# Patient Record
Sex: MALE | Race: BLACK OR AFRICAN AMERICAN | HISPANIC OR LATINO | Employment: FULL TIME | URBAN - METROPOLITAN AREA
[De-identification: names, ages, dates, MRNs, and addresses within clinical notes are randomized per-mention and may not be internally consistent; named-entity substitution may affect disease eponyms.]

---

## 2021-08-20 ENCOUNTER — HOSPITAL ENCOUNTER (EMERGENCY)
Facility: HOSPITAL | Age: 46
Discharge: HOME | End: 2021-08-20
Attending: EMERGENCY MEDICINE
Payer: OTHER GOVERNMENT

## 2021-08-20 VITALS
WEIGHT: 185 LBS | TEMPERATURE: 99 F | OXYGEN SATURATION: 98 % | SYSTOLIC BLOOD PRESSURE: 155 MMHG | HEIGHT: 71 IN | RESPIRATION RATE: 16 BRPM | DIASTOLIC BLOOD PRESSURE: 83 MMHG | BODY MASS INDEX: 25.9 KG/M2 | HEART RATE: 76 BPM

## 2021-08-20 DIAGNOSIS — Z20.822 EXPOSURE TO COVID-19 VIRUS: ICD-10-CM

## 2021-08-20 DIAGNOSIS — Z20.822 LAB TEST NEGATIVE FOR COVID-19 VIRUS: Primary | ICD-10-CM

## 2021-08-20 LAB
CTP QC/QA: YES
SARS-COV-2 RDRP RESP QL NAA+PROBE: NEGATIVE

## 2021-08-20 PROCEDURE — U0002 COVID-19 LAB TEST NON-CDC: HCPCS | Performed by: EMERGENCY MEDICINE

## 2021-08-20 PROCEDURE — 99283 EMERGENCY DEPT VISIT LOW MDM: CPT | Mod: CS,,, | Performed by: PHYSICIAN ASSISTANT

## 2021-08-20 PROCEDURE — 63600175 PHARM REV CODE 636 W HCPCS: Performed by: PHYSICIAN ASSISTANT

## 2021-08-20 PROCEDURE — 91303 PHARM REV CODE 636 W HCPCS: CPT | Performed by: PHYSICIAN ASSISTANT

## 2021-08-20 PROCEDURE — 99283 PR EMERGENCY DEPT VISIT,LEVEL III: ICD-10-PCS | Mod: CS,,, | Performed by: PHYSICIAN ASSISTANT

## 2021-08-20 PROCEDURE — 99284 EMERGENCY DEPT VISIT MOD MDM: CPT | Mod: 25

## 2021-08-20 PROCEDURE — 0031A HC IMMUNIZ ADMIN, SARS-COV-2 COVID-19 VACC, 5X10VP/0.5ML: CPT | Performed by: PHYSICIAN ASSISTANT

## 2021-08-20 RX ADMIN — JNJ-78436735 0.5 ML: 50000000000 SUSPENSION INTRAMUSCULAR at 03:08

## 2025-07-03 ENCOUNTER — HOSPITAL ENCOUNTER (EMERGENCY)
Facility: HOSPITAL | Age: 50
Discharge: HOME | End: 2025-07-03
Attending: EMERGENCY MEDICINE

## 2025-07-03 VITALS
RESPIRATION RATE: 16 BRPM | SYSTOLIC BLOOD PRESSURE: 162 MMHG | DIASTOLIC BLOOD PRESSURE: 106 MMHG | WEIGHT: 190 LBS | BODY MASS INDEX: 26.6 KG/M2 | TEMPERATURE: 99 F | HEIGHT: 71 IN | OXYGEN SATURATION: 98 % | HEART RATE: 58 BPM

## 2025-07-03 DIAGNOSIS — Z86.79 HISTORY OF HYPERTENSION: ICD-10-CM

## 2025-07-03 DIAGNOSIS — R03.0 ELEVATED BLOOD PRESSURE READING: Primary | ICD-10-CM

## 2025-07-03 PROCEDURE — 99283 EMERGENCY DEPT VISIT LOW MDM: CPT

## 2025-07-03 PROCEDURE — 25000003 PHARM REV CODE 250: Performed by: PHYSICIAN ASSISTANT

## 2025-07-03 RX ORDER — AMLODIPINE BESYLATE 10 MG/1
5 TABLET ORAL DAILY
Qty: 15 TABLET | Refills: 1 | Status: SHIPPED | OUTPATIENT
Start: 2025-07-03 | End: 2025-08-02

## 2025-07-03 RX ORDER — AMLODIPINE BESYLATE 5 MG/1
5 TABLET ORAL
Status: COMPLETED | OUTPATIENT
Start: 2025-07-03 | End: 2025-07-03

## 2025-07-03 RX ADMIN — AMLODIPINE BESYLATE 5 MG: 5 TABLET ORAL at 11:07

## 2025-07-03 NOTE — DISCHARGE INSTRUCTIONS

## 2025-07-03 NOTE — ED TRIAGE NOTES
Patient reports elevated blood pressure. He reports he was diagnosed in the ED and was sent home with medications but he hasn't been able to see primary care provider for long-term management. Denies headache, blurred vision, chest pain or shortness of breath.

## 2025-07-03 NOTE — ED PROVIDER NOTES
Encounter Date: 7/3/2025    SCRIBE #1 NOTE: I, Ryan Lal, am scribing for, and in the presence of,  Gabi Morrison PA-C. I have scribed the following portions of the note - Other sections scribed: HPI, ROS, PE.       History     Chief Complaint   Patient presents with    Hypertension     Pt with hx of HTN reports to ED for increased BP. No medications x 2-3 years. Pt asymptomatic. Pt states they told him his BP was high while at appointment for a job      Chief complaint: Hypertension    HPI: Darwin Peters is a 50 y.o. male, with a past medical history of HTN, who presents to the Emergency Department with elevated blood pressure. Patient reports he has been off antihypertensive medications for the past two to three years. He was previously established with a primary care provider in [place] but has since moved and is now living here and is currently without a local provider. He was found to have elevated blood pressure at work and is seeking medical clearance to return. No other exacerbating or alleviating factors. Denies chest pain, shortness of breath, dizziness, lightheadedness, headache, blurry vision, weakness, numbness, tingling, speech difficulty, or confusion.            The history is provided by the patient. The history is limited by a language barrier. A  was used ().     Review of patient's allergies indicates:  No Known Allergies  History reviewed. No pertinent past medical history.  History reviewed. No pertinent surgical history.  No family history on file.  Social History[1]  Review of Systems   Constitutional:  Negative for chills and fever.   HENT:  Negative for congestion, ear pain, rhinorrhea and sore throat.    Eyes:  Negative for redness and visual disturbance.   Respiratory:  Negative for shortness of breath and stridor.    Cardiovascular:  Negative for chest pain.        (+) elevated blood pressure   Gastrointestinal:  Negative for abdominal  pain, constipation, diarrhea, nausea and vomiting.   Genitourinary:  Negative for dysuria, frequency, hematuria and urgency.   Musculoskeletal:  Negative for back pain and neck pain.   Skin:  Negative for rash.   Neurological:  Negative for dizziness, speech difficulty, weakness, light-headedness, numbness and headaches.   Hematological:  Does not bruise/bleed easily.   Psychiatric/Behavioral:  Negative for confusion.         (-) speech difficulty       Physical Exam     Initial Vitals [07/03/25 1104]   BP Pulse Resp Temp SpO2   (!) 172/116 63 18 98.5 °F (36.9 °C) 99 %      MAP       --         Physical Exam    Nursing note and vitals reviewed.  Constitutional: He appears well-developed and well-nourished. No distress.   HENT:   Head: Normocephalic.   Right Ear: External ear normal.   Left Ear: External ear normal.   Eyes: Conjunctivae are normal.   Cardiovascular:  Normal rate, regular rhythm, normal heart sounds and normal pulses.     Exam reveals no gallop and no friction rub.       No murmur heard.  Pulses:       Radial pulses are 2+ on the right side and 2+ on the left side.        Posterior tibial pulses are 2+ on the right side and 2+ on the left side.   Pulmonary/Chest: Effort normal and breath sounds normal. No respiratory distress. He has no wheezes. He has no rhonchi. He has no rales.   Abdominal: Abdomen is soft. He exhibits no distension. There is no abdominal tenderness. There is no rebound and no guarding.   Musculoskeletal:         General: Normal range of motion.      Comments: No lower extremity swelling     Neurological: He is alert.   Skin: Skin is warm and dry. No rash noted.   Psychiatric: He has a normal mood and affect. His behavior is normal. Judgment and thought content normal.         ED Course   Procedures  Labs Reviewed - No data to display       Imaging Results    None          Medications   amLODIPine tablet 5 mg (5 mg Oral Given 7/3/25 1120)     Medical Decision Making  50 year old  male with history of hypertension medication noncompliance sent by his workplace for evaluation of elevated BPs. Asymptomatic.   /100 on my evaluation  Heart and lung exam unremarkable. No extremity swelling. 2+ pulses bilaterally   Will have pt follow up with PCP. Short course of amlodipine prescribed as this appears to be what pt was on previously.     Will have pt log BPs at home and then follow up with primary care and return to ER for wrosening symptoms or as needed. PCP referral placed.     Risk  Prescription drug management.            Scribe Attestation:   Scribe #1: I performed the above scribed service and the documentation accurately describes the services I performed. I attest to the accuracy of the note.                           I, Gabi Morrison PA-C , personally performed the services described in this documentation. All medical record entries made by the scribe were at my direction and in my presence. I have reviewed the chart and agree that the record reflects my personal performance and is accurate and complete.      DISCLAIMER: This note was prepared with [employer] voice recognition transcription software. Garbled syntax, mangled pronouns, and other bizarre constructions may be attributed to that software system.      Clinical Impression:  Final diagnoses:  [R03.0] Elevated blood pressure reading (Primary)  [Z86.79] History of hypertension          ED Disposition Condition    Discharge Stable          ED Prescriptions       Medication Sig Dispense Start Date End Date Auth. Provider    amLODIPine (NORVASC) 10 MG tablet Take 0.5 tablets (5 mg total) by mouth once daily. 15 tablet 7/3/2025 8/2/2025 Gabi Morrison PA-C          Follow-up Information       Follow up With Specialties Details Why Contact Info Additional Information    [facility]  Schedule an appointment as soon as possible for a visit in 2 days for follow up [address] [facility] [address] [facility]  [id number]  [phone]       [facility] Family [facility] Schedule an appointment as soon as possible for a visit in 2 days for follow up [address] [facility] [id number] [phone] 2nd Floor    [facility] -  Schedule an appointment as soon as possible for a visit   [address]  [facility] [address]  [phone]       [facility]  Schedule an appointment as soon as possible for a visit   [address]  [facility] [address]  [phone]                      [1]   Social History  Tobacco Use    Smoking status: Every Day     Types: Cigarettes    Smokeless tobacco: Never   Vaping Use    Vaping status: Never Used   Substance Use Topics    Alcohol use: Yes     Comment: Gabi Taylor PAIrinaC  07/03/25 1320